# Patient Record
Sex: MALE | Race: WHITE | NOT HISPANIC OR LATINO | Employment: FULL TIME | URBAN - METROPOLITAN AREA
[De-identification: names, ages, dates, MRNs, and addresses within clinical notes are randomized per-mention and may not be internally consistent; named-entity substitution may affect disease eponyms.]

---

## 2017-09-29 ENCOUNTER — LAB REQUISITION (OUTPATIENT)
Dept: LAB | Facility: HOSPITAL | Age: 42
End: 2017-09-29

## 2017-09-29 ENCOUNTER — OUTSIDE FACILITY SERVICE (OUTPATIENT)
Dept: GASTROENTEROLOGY | Facility: CLINIC | Age: 42
End: 2017-09-29

## 2017-09-29 DIAGNOSIS — Z80.0 FAMILY HISTORY OF MALIGNANT NEOPLASM OF DIGESTIVE ORGAN: ICD-10-CM

## 2017-09-29 DIAGNOSIS — D12.2 BENIGN NEOPLASM OF ASCENDING COLON: ICD-10-CM

## 2017-09-29 PROCEDURE — 88305 TISSUE EXAM BY PATHOLOGIST: CPT | Performed by: INTERNAL MEDICINE

## 2017-09-29 PROCEDURE — 45385 COLONOSCOPY W/LESION REMOVAL: CPT | Performed by: INTERNAL MEDICINE

## 2017-10-02 LAB
CYTO UR: NORMAL
LAB AP CASE REPORT: NORMAL
LAB AP CLINICAL INFORMATION: NORMAL
Lab: NORMAL
PATH REPORT.FINAL DX SPEC: NORMAL
PATH REPORT.GROSS SPEC: NORMAL

## 2017-10-03 ENCOUNTER — TELEPHONE (OUTPATIENT)
Dept: GASTROENTEROLOGY | Facility: CLINIC | Age: 42
End: 2017-10-03

## 2017-10-03 NOTE — TELEPHONE ENCOUNTER
----- Message from Nav Felton MD sent at 10/3/2017  3:02 PM EDT -----  Let Mr. Banerjee know that he did have a serrated adenoma.  He should have a repeat exam in 3 years given the finding and his family history.  Thank you,  JOSE

## 2017-10-04 ENCOUNTER — TELEPHONE (OUTPATIENT)
Dept: GASTROENTEROLOGY | Facility: CLINIC | Age: 42
End: 2017-10-04

## 2017-10-05 NOTE — TELEPHONE ENCOUNTER
Returned patient's call. Informed him that I had already talked to him since I left him that voice message. Patient voiced understanding.

## 2021-04-05 DIAGNOSIS — Z12.11 SCREENING FOR COLON CANCER: Primary | ICD-10-CM

## 2021-04-26 DIAGNOSIS — Z12.11 SCREENING FOR COLON CANCER: Primary | ICD-10-CM

## 2021-04-26 RX ORDER — SODIUM, POTASSIUM,MAG SULFATES 17.5-3.13G
1 SOLUTION, RECONSTITUTED, ORAL ORAL TAKE AS DIRECTED
Qty: 354 ML | Refills: 0 | Status: SHIPPED | OUTPATIENT
Start: 2021-04-26

## 2021-05-19 ENCOUNTER — OUTSIDE FACILITY SERVICE (OUTPATIENT)
Dept: GASTROENTEROLOGY | Facility: CLINIC | Age: 46
End: 2021-05-19

## 2021-05-19 PROCEDURE — 45378 DIAGNOSTIC COLONOSCOPY: CPT | Performed by: INTERNAL MEDICINE

## 2024-05-01 ENCOUNTER — TELEPHONE (OUTPATIENT)
Dept: GASTROENTEROLOGY | Facility: CLINIC | Age: 49
End: 2024-05-01

## 2024-05-01 NOTE — TELEPHONE ENCOUNTER
Hub staff attempted to follow warm transfer process and was unsuccessful     Caller: YASH TRAVIS    Relationship to patient: Emergency Contact    Best call back number: [phone]    Patient is needing: PTS WIFE CALLING TO SCHEDULE SCOPE. (NO BHV ON FILE)

## 2024-06-04 RX ORDER — SODIUM, POTASSIUM,MAG SULFATES 17.5-3.13G
1 SOLUTION, RECONSTITUTED, ORAL ORAL TAKE AS DIRECTED
Qty: 354 ML | Refills: 0 | Status: SHIPPED | OUTPATIENT
Start: 2024-06-04

## 2024-06-11 ENCOUNTER — OUTSIDE FACILITY SERVICE (OUTPATIENT)
Dept: GASTROENTEROLOGY | Facility: CLINIC | Age: 49
End: 2024-06-11
Payer: COMMERCIAL

## 2025-05-06 ENCOUNTER — APPOINTMENT (OUTPATIENT)
Dept: CT IMAGING | Facility: HOSPITAL | Age: 50
End: 2025-05-06
Payer: COMMERCIAL

## 2025-05-06 ENCOUNTER — APPOINTMENT (OUTPATIENT)
Dept: GENERAL RADIOLOGY | Facility: HOSPITAL | Age: 50
End: 2025-05-06
Payer: COMMERCIAL

## 2025-05-06 ENCOUNTER — HOSPITAL ENCOUNTER (OUTPATIENT)
Facility: HOSPITAL | Age: 50
Setting detail: OBSERVATION
Discharge: HOME | End: 2025-05-07
Attending: EMERGENCY MEDICINE | Admitting: HOSPITALIST
Payer: COMMERCIAL

## 2025-05-06 DIAGNOSIS — F10.90 ALCOHOL USE: ICD-10-CM

## 2025-05-06 DIAGNOSIS — Z86.39 HISTORY OF HYPERLIPIDEMIA: ICD-10-CM

## 2025-05-06 DIAGNOSIS — R07.89 ATYPICAL CHEST PAIN: Primary | ICD-10-CM

## 2025-05-06 DIAGNOSIS — Z72.0 TOBACCO USE: ICD-10-CM

## 2025-05-06 DIAGNOSIS — R06.09 EXERTIONAL DYSPNEA: ICD-10-CM

## 2025-05-06 DIAGNOSIS — K76.9 LESION OF LIVER: ICD-10-CM

## 2025-05-06 DIAGNOSIS — R11.0 NAUSEA: ICD-10-CM

## 2025-05-06 PROBLEM — R07.9 CHEST PAIN: Status: ACTIVE | Noted: 2025-05-06

## 2025-05-06 LAB
ALBUMIN SERPL-MCNC: 4.2 G/DL (ref 3.5–5.2)
ALBUMIN/GLOB SERPL: 1.8 G/DL
ALP SERPL-CCNC: 58 U/L (ref 39–117)
ALT SERPL W P-5'-P-CCNC: 28 U/L (ref 1–41)
ANION GAP SERPL CALCULATED.3IONS-SCNC: 12 MMOL/L (ref 5–15)
AST SERPL-CCNC: 22 U/L (ref 1–40)
BASOPHILS # BLD AUTO: 0.04 10*3/MM3 (ref 0–0.2)
BASOPHILS NFR BLD AUTO: 0.6 % (ref 0–1.5)
BILIRUB SERPL-MCNC: 0.3 MG/DL (ref 0–1.2)
BUN SERPL-MCNC: 11 MG/DL (ref 6–20)
BUN/CREAT SERPL: 9.2 (ref 7–25)
CALCIUM SPEC-SCNC: 9.4 MG/DL (ref 8.6–10.5)
CHLORIDE SERPL-SCNC: 103 MMOL/L (ref 98–107)
CO2 SERPL-SCNC: 24 MMOL/L (ref 22–29)
CREAT SERPL-MCNC: 1.19 MG/DL (ref 0.76–1.27)
DEPRECATED RDW RBC AUTO: 41.3 FL (ref 37–54)
EGFRCR SERPLBLD CKD-EPI 2021: 75.3 ML/MIN/1.73
EOSINOPHIL # BLD AUTO: 0.39 10*3/MM3 (ref 0–0.4)
EOSINOPHIL NFR BLD AUTO: 5.8 % (ref 0.3–6.2)
ERYTHROCYTE [DISTWIDTH] IN BLOOD BY AUTOMATED COUNT: 12.8 % (ref 12.3–15.4)
GEN 5 1HR TROPONIN T REFLEX: 7 NG/L
GLOBULIN UR ELPH-MCNC: 2.4 GM/DL
GLUCOSE SERPL-MCNC: 101 MG/DL (ref 65–99)
HCT VFR BLD AUTO: 49.1 % (ref 37.5–51)
HGB BLD-MCNC: 16.4 G/DL (ref 13–17.7)
HOLD SPECIMEN: NORMAL
IMM GRANULOCYTES # BLD AUTO: 0.01 10*3/MM3 (ref 0–0.05)
IMM GRANULOCYTES NFR BLD AUTO: 0.1 % (ref 0–0.5)
LIPASE SERPL-CCNC: 63 U/L (ref 13–60)
LYMPHOCYTES # BLD AUTO: 2.04 10*3/MM3 (ref 0.7–3.1)
LYMPHOCYTES NFR BLD AUTO: 30.1 % (ref 19.6–45.3)
MCH RBC QN AUTO: 29.2 PG (ref 26.6–33)
MCHC RBC AUTO-ENTMCNC: 33.4 G/DL (ref 31.5–35.7)
MCV RBC AUTO: 87.5 FL (ref 79–97)
MONOCYTES # BLD AUTO: 0.66 10*3/MM3 (ref 0.1–0.9)
MONOCYTES NFR BLD AUTO: 9.7 % (ref 5–12)
NEUTROPHILS NFR BLD AUTO: 3.63 10*3/MM3 (ref 1.7–7)
NEUTROPHILS NFR BLD AUTO: 53.7 % (ref 42.7–76)
NRBC BLD AUTO-RTO: 0 /100 WBC (ref 0–0.2)
NT-PROBNP SERPL-MCNC: <36 PG/ML (ref 0–450)
PLATELET # BLD AUTO: 242 10*3/MM3 (ref 140–450)
PMV BLD AUTO: 9.3 FL (ref 6–12)
POTASSIUM SERPL-SCNC: 3.6 MMOL/L (ref 3.5–5.2)
PROT SERPL-MCNC: 6.6 G/DL (ref 6–8.5)
RBC # BLD AUTO: 5.61 10*6/MM3 (ref 4.14–5.8)
SODIUM SERPL-SCNC: 139 MMOL/L (ref 136–145)
TROPONIN T NUMERIC DELTA: 0 NG/L
TROPONIN T SERPL HS-MCNC: 7 NG/L
WBC NRBC COR # BLD AUTO: 6.77 10*3/MM3 (ref 3.4–10.8)
WHOLE BLOOD HOLD COAG: NORMAL
WHOLE BLOOD HOLD SPECIMEN: NORMAL

## 2025-05-06 PROCEDURE — 83880 ASSAY OF NATRIURETIC PEPTIDE: CPT | Performed by: EMERGENCY MEDICINE

## 2025-05-06 PROCEDURE — 25510000001 IOPAMIDOL PER 1 ML: Performed by: EMERGENCY MEDICINE

## 2025-05-06 PROCEDURE — 80053 COMPREHEN METABOLIC PANEL: CPT | Performed by: EMERGENCY MEDICINE

## 2025-05-06 PROCEDURE — G0378 HOSPITAL OBSERVATION PER HR: HCPCS

## 2025-05-06 PROCEDURE — 36415 COLL VENOUS BLD VENIPUNCTURE: CPT

## 2025-05-06 PROCEDURE — 83690 ASSAY OF LIPASE: CPT | Performed by: EMERGENCY MEDICINE

## 2025-05-06 PROCEDURE — 71275 CT ANGIOGRAPHY CHEST: CPT

## 2025-05-06 PROCEDURE — 71045 X-RAY EXAM CHEST 1 VIEW: CPT

## 2025-05-06 PROCEDURE — 99285 EMERGENCY DEPT VISIT HI MDM: CPT

## 2025-05-06 PROCEDURE — 93005 ELECTROCARDIOGRAM TRACING: CPT | Performed by: EMERGENCY MEDICINE

## 2025-05-06 PROCEDURE — 84484 ASSAY OF TROPONIN QUANT: CPT | Performed by: EMERGENCY MEDICINE

## 2025-05-06 PROCEDURE — 85025 COMPLETE CBC W/AUTO DIFF WBC: CPT | Performed by: EMERGENCY MEDICINE

## 2025-05-06 RX ORDER — IOPAMIDOL 755 MG/ML
100 INJECTION, SOLUTION INTRAVASCULAR
Status: COMPLETED | OUTPATIENT
Start: 2025-05-06 | End: 2025-05-06

## 2025-05-06 RX ORDER — SODIUM CHLORIDE 0.9 % (FLUSH) 0.9 %
10 SYRINGE (ML) INJECTION AS NEEDED
Status: DISCONTINUED | OUTPATIENT
Start: 2025-05-06 | End: 2025-05-07 | Stop reason: HOSPADM

## 2025-05-06 RX ORDER — ASPIRIN 81 MG/1
324 TABLET, CHEWABLE ORAL ONCE
Status: COMPLETED | OUTPATIENT
Start: 2025-05-06 | End: 2025-05-06

## 2025-05-06 RX ADMIN — IOPAMIDOL 70 ML: 755 INJECTION, SOLUTION INTRAVENOUS at 21:25

## 2025-05-06 RX ADMIN — ASPIRIN 324 MG: 81 TABLET, CHEWABLE ORAL at 20:41

## 2025-05-06 NOTE — Clinical Note
Level of Care: Telemetry [5]   Diagnosis: Chest pain [316212]   Admitting Physician: DENITA MACHADO III [[id number]]   Attending Physician: DENITA MACHADO III [[id number]]   Bed Request Comments: tele obs

## 2025-05-06 NOTE — ED PROVIDER NOTES
Subjective   History of Present Illness  This is a 50-year-old male that presents to the ER with waxing and waning exertional left chest heaviness and shortness of breath that has been ongoing since 11 AM earlier this morning.  Patient is a  and is quite active at work performing strenuous activity of lifting repetitively and driving extended periods each day.  Patient says around 11 AM this morning he had sudden onset of left chest tightness and squeezing sensation with left arm numbness and tingling and associated shortness of breath and nausea.  He left work and was evaluated at [place] [facility] with 2 normal cardiac troponins.  Patient went home and then started to experience further left-sided chest pressure with squeezing sensation and exertional dyspnea and nausea.  He became concerned and came to the ER for further assessment.  He has had a stress test, but it has been greater than 5 years ago and it was normal.  He chews tobacco and says that outpatient labs recently showed elevated LDL, although patient does not take any medications for hyperlipidemia.  He does report family history of CAD in maternal grandfather at 66 years of age.  Patient denies any recent cough, nasal congestion, or respiratory symptoms.  No other concerns at this time.    History provided by:  Patient, spouse and medical records  Chest Pain  Pain location:  L chest  Pain quality: tightness    Pain quality comment:  Squeezing sensation with pressure  Radiates to: Left arm numbness and tingling.  Onset quality:  Sudden  Duration:  8 hours  Timing:  Intermittent  Progression:  Worsening  Chronicity:  New  Context comment:  Patient has had a 8-hour history of waxing and waning exertional left chest heaviness, associated shortness of breath, and nausea.  Relieved by:  Nothing  Worsened by:  Exertion (Activity and exertion)  Ineffective treatments:  None tried  Associated symptoms: anorexia, dizziness, nausea, numbness (Numbness and  tingling to left arm) and shortness of breath (exertional dyspnea)    Associated symptoms: no abdominal pain, no altered mental status, no anxiety, no back pain, no cough, no diaphoresis, no fatigue, no headache, no heartburn, no lower extremity edema, no near-syncope, no palpitations, no vomiting and no weakness    Risk factors: high cholesterol, obesity and smoking    Risk factors: no coronary artery disease, no diabetes mellitus and no hypertension    Risk factors comment:  Family history of CAD in maternal GF at 66 years of age.      Review of Systems   Constitutional:  Positive for appetite change. Negative for diaphoresis and fatigue.   HENT: Negative.  Negative for congestion, ear pain, postnasal drip, rhinorrhea, sinus pressure, sinus pain, sneezing and sore throat.    Respiratory:  Positive for shortness of breath (exertional dyspnea). Negative for cough.         Patient chews tobacco   Cardiovascular:  Positive for chest pain (Left chest heaviness/squeezing). Negative for palpitations, leg swelling and near-syncope.   Gastrointestinal:  Positive for anorexia and nausea. Negative for abdominal pain, diarrhea, heartburn and vomiting.   Genitourinary: Negative.    Musculoskeletal: Negative.  Negative for back pain.   Neurological:  Positive for dizziness and numbness (Numbness and tingling to left arm). Negative for syncope, weakness and headaches.   Psychiatric/Behavioral:          Patient usually drinks 2 beers each evening after work   All other systems reviewed and are negative.      No past medical history on file.    No Known Allergies    No past surgical history on file.    Family History   Problem Relation Age of Onset    ALS Father     Heart disease Maternal Grandfather        Social History     Socioeconomic History    Marital status:    Tobacco Use    Smoking status: Never    Smokeless tobacco: Current   Substance and Sexual Activity    Alcohol use: Yes     Alcohol/week: 2.0 standard drinks  of alcohol     Types: 2 Cans of beer per week     Comment: 1-2 beers daily    Drug use: Never    Sexual activity: Defer           Objective   Physical Exam  Vitals and nursing note reviewed.   Constitutional:       General: He is not in acute distress.     Appearance: Normal appearance. He is not ill-appearing, toxic-appearing or diaphoretic.      Comments: No acute sign of pain or distress.  Nontoxic   HENT:      Head: Normocephalic and atraumatic.      Nose: Nose normal. No congestion or rhinorrhea.      Mouth/Throat:      Mouth: Mucous membranes are moist.      Pharynx: Oropharynx is clear.      Comments: Oral mucous membranes are moist  Eyes:      Extraocular Movements: Extraocular movements intact.      Conjunctiva/sclera: Conjunctivae normal.      Pupils: Pupils are equal, round, and reactive to light.   Cardiovascular:      Rate and Rhythm: Normal rate and regular rhythm. No extrasystoles are present.     Pulses: Normal pulses.      Heart sounds: Normal heart sounds.      Comments: Regular rate and rhythm.  No tachycardia or ectopy.  No pedal edema to lower extremities  Pulmonary:      Effort: Pulmonary effort is normal. No tachypnea or retractions.      Breath sounds: Normal breath sounds. No decreased breath sounds.      Comments: Lungs are clear to auscultation bilaterally  Chest:      Chest wall: No swelling, tenderness, crepitus or edema.      Comments: Chest wall nontender to palpation  Abdominal:      General: Bowel sounds are normal. There is no distension.      Palpations: Abdomen is soft.      Tenderness: There is no abdominal tenderness. There is no right CVA tenderness, left CVA tenderness, guarding or rebound.      Comments: Abdomen soft and nontender.  No flank or CVA tenderness   Musculoskeletal:         General: Normal range of motion.      Cervical back: Normal range of motion and neck supple.      Right lower leg: No edema.      Left lower leg: No edema.   Skin:     General: Skin is warm and  dry.   Neurological:      General: No focal deficit present.      Mental Status: He is alert and oriented to person, place, and time.      Cranial Nerves: Cranial nerves 2-12 are intact.      Sensory: Sensation is intact.      Motor: Motor function is intact.      Coordination: Coordination is intact.      Gait: Gait is intact.      Comments: Neuro intact and nonfocal   Psychiatric:         Mood and Affect: Mood and affect normal.         Behavior: Behavior is cooperative.         Thought Content: Thought content normal.         Cognition and Memory: Cognition and memory normal.         Judgment: Judgment normal.      Comments: Normal mood and affect         Procedures           ED Course  ED Course as of 05/06/25 2349   Tue May 06, 2025   2346 I personally interpreted EKGs x 2 which revealed normal sinus rhythm.  There was no acute ST-T wave changes consistent with ischemia.  I personally interpreted chest x-ray which reveals some increased perihilar markings but no acute infiltrative process or pulmonary vascular congestion.  CBC and chemistries were within normal limits, including normal LFTs.  High-sensitivity troponins x 2 sets were 7 and BNP was less than 36.  Heart score is 4 and PERC score is 1.  We proceeded with CT angiogram of the chest with contrast which revealed no PE or other acute cardiopulmonary process.  Patient did have incidental finding of some multiple small low-density lesions in the liver measuring slightly higher than fluid density.  Radiologist recommended contrast-enhanced hepatic protocol MRI.  Discussed the case with Dr. Rodriguez, ER attending physician.  We recommend overnight observational admission for cardiac rule out per MI protocol and further assessment of liver lesions.  Discussed admission with Dr. Clayton, and he is agreeable to admission on telemetry.  I updated patient and wife on all results and need for admission and they are appreciative.  We gave full-strength aspirin at  324 mg by mouth and patient has not had any chest pain throughout the ER course.  I did review his diagnostic workup through [facility], and he had reassuring cardiac workup with 2 normal high-sensitivity troponins. [FC]      ED Course User Index  [FC] Mikaela Haque PA-C                  HEART Score: 4                                    Recent Results (from the past 24 hours)   ECG 12 Lead Chest Pain    Collection Time: 05/06/25  7:55 PM   Result Value Ref Range    QT Interval 364 ms    QTC Interval 417 ms   High Sensitivity Troponin T    Collection Time: 05/06/25  8:18 PM    Specimen: Blood   Result Value Ref Range    HS Troponin T 7 <22 ng/L   Comprehensive Metabolic Panel    Collection Time: 05/06/25  8:18 PM    Specimen: Blood   Result Value Ref Range    Glucose 101 (H) 65 - 99 mg/dL    BUN 11 6 - 20 mg/dL    Creatinine 1.19 0.76 - 1.27 mg/dL    Sodium 139 136 - 145 mmol/L    Potassium 3.6 3.5 - 5.2 mmol/L    Chloride 103 98 - 107 mmol/L    CO2 24.0 22.0 - 29.0 mmol/L    Calcium 9.4 8.6 - 10.5 mg/dL    Total Protein 6.6 6.0 - 8.5 g/dL    Albumin 4.2 3.5 - 5.2 g/dL    ALT (SGPT) 28 1 - 41 U/L    AST (SGOT) 22 1 - 40 U/L    Alkaline Phosphatase 58 39 - 117 U/L    Total Bilirubin 0.3 0.0 - 1.2 mg/dL    Globulin 2.4 gm/dL    A/G Ratio 1.8 g/dL    BUN/Creatinine Ratio 9.2 7.0 - 25.0    Anion Gap 12.0 5.0 - 15.0 mmol/L    eGFR 75.3 >60.0 mL/min/1.73   Lipase    Collection Time: 05/06/25  8:18 PM    Specimen: Blood   Result Value Ref Range    Lipase 63 (H) 13 - 60 U/L   BNP    Collection Time: 05/06/25  8:18 PM    Specimen: Blood   Result Value Ref Range    proBNP <36.0 0.0 - 450.0 pg/mL   Green Top (Gel)    Collection Time: 05/06/25  8:18 PM   Result Value Ref Range    Extra Tube Hold for add-ons.    Lavender Top    Collection Time: 05/06/25  8:18 PM   Result Value Ref Range    Extra Tube hold for add-on    Gold Top - SST    Collection Time: 05/06/25  8:18 PM   Result Value Ref Range    Extra Tube Hold for add-ons.     Fatima Top    Collection Time: 05/06/25  8:18 PM   Result Value Ref Range    Extra Tube Hold for add-ons.    Light Blue Top    Collection Time: 05/06/25  8:18 PM   Result Value Ref Range    Extra Tube Hold for add-ons.    CBC Auto Differential    Collection Time: 05/06/25  8:18 PM    Specimen: Blood   Result Value Ref Range    WBC 6.77 3.40 - 10.80 10*3/mm3    RBC 5.61 4.14 - 5.80 10*6/mm3    Hemoglobin 16.4 13.0 - 17.7 g/dL    Hematocrit 49.1 37.5 - 51.0 %    MCV 87.5 79.0 - 97.0 fL    MCH 29.2 26.6 - 33.0 pg    MCHC 33.4 31.5 - 35.7 g/dL    RDW 12.8 12.3 - 15.4 %    RDW-SD 41.3 37.0 - 54.0 fl    MPV 9.3 6.0 - 12.0 fL    Platelets 242 140 - 450 10*3/mm3    Neutrophil % 53.7 42.7 - 76.0 %    Lymphocyte % 30.1 19.6 - 45.3 %    Monocyte % 9.7 5.0 - 12.0 %    Eosinophil % 5.8 0.3 - 6.2 %    Basophil % 0.6 0.0 - 1.5 %    Immature Grans % 0.1 0.0 - 0.5 %    Neutrophils, Absolute 3.63 1.70 - 7.00 10*3/mm3    Lymphocytes, Absolute 2.04 0.70 - 3.10 10*3/mm3    Monocytes, Absolute 0.66 0.10 - 0.90 10*3/mm3    Eosinophils, Absolute 0.39 0.00 - 0.40 10*3/mm3    Basophils, Absolute 0.04 0.00 - 0.20 10*3/mm3    Immature Grans, Absolute 0.01 0.00 - 0.05 10*3/mm3    nRBC 0.0 0.0 - 0.2 /100 WBC   ECG 12 Lead Chest Pain    Collection Time: 05/06/25  8:55 PM   Result Value Ref Range    QT Interval 388 ms    QTC Interval 427 ms   High Sensitivity Troponin T 1Hr    Collection Time: 05/06/25  9:39 PM    Specimen: Blood   Result Value Ref Range    HS Troponin T 7 <22 ng/L    Troponin T Numeric Delta 0 Abnormal if >/=3 ng/L     Note: In addition to lab results from this visit, the labs listed above may include labs taken at another facility or during a different encounter within the last 24 hours. Please correlate lab times with ED admission and discharge times for further clarification of the services performed during this visit.    CT Angiogram Chest   Final Result   Impression:   1.No acute pulmonary embolism.   2.Mild bibasilar  atelectasis.   3.Multiple small low-density lesions in the liver measuring slightly higher than fluid density. Recommend correlation with contrast-enhanced hepatic protocol MRI.            Electronically Signed: Forrest Pugh MD     5/6/2025 9:33 PM EDT     Workstation ID: [id number]      XR Chest 1 View   Final Result   No acute cardiopulmonary abnormality.               Electronically Signed: Heike Turpin MD     5/6/2025 8:17 PM EDT     Workstation ID: [id number]      MRI Abdomen With & Without Contrast    (Results Pending)     Vitals:    05/06/25 2230 05/06/25 2300 05/06/25 2315 05/06/25 2330   BP: 122/88 116/87  (!) 121/107   Pulse: 65 65 69 84   Resp:       Temp:       SpO2: 95% 96% 96% 95%   Weight:       Height:         Medications   sodium chloride 0.9 % flush 10 mL (has no administration in time range)   aspirin chewable tablet 324 mg (324 mg Oral Given 5/6/25 2041)   iopamidol (ISOVUE-370) 76 % injection 100 mL (70 mL Intravenous Given 5/6/25 2125)     ECG/EMG Results (last 24 hours)       Procedure Component Value Units Date/Time    ECG 12 Lead Chest Pain [[id number]] Collected: 05/06/25 1955     Updated: 05/06/25 1955     QT Interval 364 ms      QTC Interval 417 ms     Narrative:      Test Reason : Chest Pain  Blood Pressure :   */*   mmHG  Vent. Rate :  79 BPM     Atrial Rate :  79 BPM     P-R Int : 142 ms          QRS Dur :  86 ms      QT Int : 364 ms       P-R-T Axes :  51 -33  27 degrees    QTcB Int : 417 ms    Normal sinus rhythm  Left axis deviation  Abnormal ECG  No previous ECGs available    Referred By: EDMD           Confirmed By:     ECG 12 Lead Chest Pain [[id number]] Collected: 05/06/25 2055     Updated: 05/06/25 2056     QT Interval 388 ms      QTC Interval 427 ms     Narrative:      Test Reason : Chest Pain  Blood Pressure :   */*   mmHG  Vent. Rate :  73 BPM     Atrial Rate :  73 BPM     P-R Int : 148 ms          QRS Dur :  88 ms      QT Int : 388 ms       P-R-T Axes :  52 -28  31 degrees     QTcB Int : 427 ms    Normal sinus rhythm  Possible Inferior infarct , age undetermined  Abnormal ECG  When compared with ECG of 06-May-2025 19:55, (Unconfirmed)  No significant change was found    Referred By: ED MD           Confirmed By:           ECG 12 Lead Chest Pain   Preliminary Result   Test Reason : Chest Pain   Blood Pressure :   */*   mmHG   Vent. Rate :  73 BPM     Atrial Rate :  73 BPM      P-R Int : 148 ms          QRS Dur :  88 ms       QT Int : 388 ms       P-R-T Axes :  52 -28  31 degrees     QTcB Int : 427 ms      Normal sinus rhythm   Possible Inferior infarct , age undetermined   Abnormal ECG   When compared with ECG of 06-May-2025 19:55, (Unconfirmed)   No significant change was found      Referred By: ED MD           Confirmed By:       ECG 12 Lead Chest Pain   Preliminary Result   Test Reason : Chest Pain   Blood Pressure :   */*   mmHG   Vent. Rate :  79 BPM     Atrial Rate :  79 BPM      P-R Int : 142 ms          QRS Dur :  86 ms       QT Int : 364 ms       P-R-T Axes :  51 -33  27 degrees     QTcB Int : 417 ms      Normal sinus rhythm   Left axis deviation   Abnormal ECG   No previous ECGs available      Referred By: EDMD           Confirmed By:         HEART Score for Major Cardiac Events - MDCalc  Calculated on May 06 2025 10:43 PM  4 points -> Moderate Score (4-6 points) Risk of MACE of 12-16.6%.    PERC Rule for Pulmonary Embolism - MDCalc  Calculated on May 06 2025 11:46 PM  1 criteria -> If any criteria are positive, the PERC rule cannot be used to rule out PE in this patient.  Medical Decision Making  Amount and/or Complexity of Data Reviewed  Labs: ordered.  Radiology: ordered.  ECG/medicine tests: ordered.    Risk  OTC drugs.  Prescription drug management.  Decision regarding hospitalization.        Final diagnoses:   Atypical chest pain   Exertional dyspnea   Nausea   Tobacco use   Alcohol use   History of hyperlipidemia   Lesion of liver       ED Disposition  ED Disposition        ED Disposition   Decision to Admit    Condition   --    Comment   Level of Care: Telemetry [5]   Diagnosis: Chest pain [900944]   Admitting Physician: DENITA MACHADO III [[id number]]   Attending Physician: DENITA MACHADO III [[id number]]   Is patient appropriate for Inpatient Observation Unit?: No [0]   Bed Request Comments: tele obs                 No follow-up provider specified.       Medication List      No changes were made to your prescriptions during this visit.            Mikaela Haque PA-C  05/06/25 8711

## 2025-05-07 ENCOUNTER — APPOINTMENT (OUTPATIENT)
Dept: CARDIOLOGY | Facility: HOSPITAL | Age: 50
End: 2025-05-07
Payer: COMMERCIAL

## 2025-05-07 ENCOUNTER — APPOINTMENT (OUTPATIENT)
Dept: MRI IMAGING | Facility: HOSPITAL | Age: 50
End: 2025-05-07
Payer: COMMERCIAL

## 2025-05-07 VITALS
BODY MASS INDEX: 39.28 KG/M2 | TEMPERATURE: 98 F | WEIGHT: 290 LBS | RESPIRATION RATE: 18 BRPM | HEART RATE: 80 BPM | HEIGHT: 72 IN | OXYGEN SATURATION: 96 % | SYSTOLIC BLOOD PRESSURE: 111 MMHG | DIASTOLIC BLOOD PRESSURE: 84 MMHG

## 2025-05-07 LAB
ANION GAP SERPL CALCULATED.3IONS-SCNC: 8 MMOL/L (ref 5–15)
AORTIC DIMENSIONLESS INDEX: 0.8 (DI)
ASCENDING AORTA: 3.3 CM
AV MEAN PRESS GRAD SYS DOP V1V2: 1.1 MMHG
AV VMAX SYS DOP: 70.7 CM/SEC
BASOPHILS # BLD AUTO: 0.06 10*3/MM3 (ref 0–0.2)
BASOPHILS NFR BLD AUTO: 0.9 % (ref 0–1.5)
BH CV ECHO MEAS - 2D AUTO EF SIEMENS: 58 %
BH CV ECHO MEAS - AO MAX PG: 2 MMHG
BH CV ECHO MEAS - AO ROOT DIAM: 2.9 CM
BH CV ECHO MEAS - AO V2 VTI: 13.6 CM
BH CV ECHO MEAS - AVA(I,D): 2.5 CM2
BH CV ECHO MEAS - IVS/LVPW: 1 CM
BH CV ECHO MEAS - IVSD: 1 CM
BH CV ECHO MEAS - LA DIMENSION: 3 CM
BH CV ECHO MEAS - LAT PEAK E' VEL: 10.8 CM/SEC
BH CV ECHO MEAS - LV MAX PG: 1.18 MMHG
BH CV ECHO MEAS - LV MEAN PG: 0.6 MMHG
BH CV ECHO MEAS - LV V1 MAX: 54.4 CM/SEC
BH CV ECHO MEAS - LV V1 VTI: 10.9 CM
BH CV ECHO MEAS - LVIDD: 4.5 CM
BH CV ECHO MEAS - LVIDS: 2.9 CM
BH CV ECHO MEAS - LVOT AREA: 3.1 CM2
BH CV ECHO MEAS - LVOT DIAM: 2 CM
BH CV ECHO MEAS - LVPWD: 1 CM
BH CV ECHO MEAS - MED PEAK E' VEL: 7.8 CM/SEC
BH CV ECHO MEAS - MV A MAX VEL: 48.5 CM/SEC
BH CV ECHO MEAS - MV DEC SLOPE: 173.1 CM/SEC2
BH CV ECHO MEAS - MV E MAX VEL: 45 CM/SEC
BH CV ECHO MEAS - MV E/A: 0.93
BH CV ECHO MEAS - MV MAX PG: 1.25 MMHG
BH CV ECHO MEAS - MV MEAN PG: 0.6 MMHG
BH CV ECHO MEAS - MV P1/2T: 85 MSEC
BH CV ECHO MEAS - MV V2 VTI: 18 CM
BH CV ECHO MEAS - MVA(P1/2T): 2.6 CM2
BH CV ECHO MEAS - MVA(VTI): 1.9 CM2
BH CV ECHO MEAS - PA ACC TIME: 0.12 SEC
BH CV ECHO MEAS - PI END-D VEL: 112.8 CM/SEC
BH CV ECHO MEAS - SV(LVOT): 34.2 ML
BH CV ECHO MEAS - SVI(LVOT): 13.7 ML/M2
BH CV ECHO MEAS - TAPSE (>1.6): 2.2 CM
BH CV ECHO MEASUREMENTS AVERAGE E/E' RATIO: 4.84
BH CV REST NUCLEAR ISOTOPE DOSE: 30 MCI
BH CV STRESS BP STAGE 1: NORMAL
BH CV STRESS BP STAGE 2: NORMAL
BH CV STRESS BP STAGE 4: NORMAL
BH CV STRESS COMMENTS STAGE 1: NORMAL
BH CV STRESS DOSE REGADENOSON STAGE 1: 0.4
BH CV STRESS DURATION MIN STAGE 1: 1
BH CV STRESS DURATION MIN STAGE 2: 1
BH CV STRESS DURATION MIN STAGE 3: 1
BH CV STRESS DURATION MIN STAGE 4: 1
BH CV STRESS HR STAGE 1: 101
BH CV STRESS HR STAGE 2: 89
BH CV STRESS HR STAGE 3: 81
BH CV STRESS HR STAGE 4: 82
BH CV STRESS NUCLEAR ISOTOPE DOSE: 29.9 MCI
BH CV STRESS O2 STAGE 1: 98
BH CV STRESS O2 STAGE 2: 97
BH CV STRESS O2 STAGE 3: 97
BH CV STRESS O2 STAGE 4: 97
BH CV STRESS PROTOCOL 1: NORMAL
BH CV STRESS RECOVERY BP: NORMAL MMHG
BH CV STRESS RECOVERY HR: 70 BPM
BH CV STRESS RECOVERY O2: 95 %
BH CV STRESS STAGE 1: 1
BH CV STRESS STAGE 2: 2
BH CV STRESS STAGE 3: 3
BH CV STRESS STAGE 4: 4
BH CV XLRA - RV BASE: 3.4 CM
BH CV XLRA - RV LENGTH: 7.7 CM
BH CV XLRA - RV MID: 3.3 CM
BH CV XLRA - TDI S': 9.5 CM/SEC
BUN SERPL-MCNC: 11 MG/DL (ref 6–20)
BUN/CREAT SERPL: 8.9 (ref 7–25)
CALCIUM SPEC-SCNC: 9.1 MG/DL (ref 8.6–10.5)
CHLORIDE SERPL-SCNC: 105 MMOL/L (ref 98–107)
CHOLEST SERPL-MCNC: 194 MG/DL (ref 0–200)
CO2 SERPL-SCNC: 27 MMOL/L (ref 22–29)
CREAT SERPL-MCNC: 1.24 MG/DL (ref 0.76–1.27)
DEPRECATED RDW RBC AUTO: 41.1 FL (ref 37–54)
EGFRCR SERPLBLD CKD-EPI 2021: 71.7 ML/MIN/1.73
EOSINOPHIL # BLD AUTO: 0.47 10*3/MM3 (ref 0–0.4)
EOSINOPHIL NFR BLD AUTO: 7.3 % (ref 0.3–6.2)
ERYTHROCYTE [DISTWIDTH] IN BLOOD BY AUTOMATED COUNT: 12.9 % (ref 12.3–15.4)
GLUCOSE SERPL-MCNC: 102 MG/DL (ref 65–99)
HBA1C MFR BLD: 5.4 % (ref 4.8–5.6)
HCT VFR BLD AUTO: 50.1 % (ref 37.5–51)
HDLC SERPL-MCNC: 50 MG/DL (ref 40–60)
HGB BLD-MCNC: 16.8 G/DL (ref 13–17.7)
IMM GRANULOCYTES # BLD AUTO: 0.01 10*3/MM3 (ref 0–0.05)
IMM GRANULOCYTES NFR BLD AUTO: 0.2 % (ref 0–0.5)
LDLC SERPL CALC-MCNC: 124 MG/DL (ref 0–100)
LDLC/HDLC SERPL: 2.43 {RATIO}
LEFT ATRIUM VOLUME INDEX: 18.3 ML/M2
LYMPHOCYTES # BLD AUTO: 1.93 10*3/MM3 (ref 0.7–3.1)
LYMPHOCYTES NFR BLD AUTO: 30.1 % (ref 19.6–45.3)
MAXIMAL PREDICTED HEART RATE: 172 BPM
MCH RBC QN AUTO: 29.3 PG (ref 26.6–33)
MCHC RBC AUTO-ENTMCNC: 33.5 G/DL (ref 31.5–35.7)
MCV RBC AUTO: 87.4 FL (ref 79–97)
MONOCYTES # BLD AUTO: 0.56 10*3/MM3 (ref 0.1–0.9)
MONOCYTES NFR BLD AUTO: 8.7 % (ref 5–12)
NEUTROPHILS NFR BLD AUTO: 3.38 10*3/MM3 (ref 1.7–7)
NEUTROPHILS NFR BLD AUTO: 52.8 % (ref 42.7–76)
NRBC BLD AUTO-RTO: 0 /100 WBC (ref 0–0.2)
PERCENT MAX PREDICTED HR: 58.14 %
PLATELET # BLD AUTO: 226 10*3/MM3 (ref 140–450)
PMV BLD AUTO: 9.1 FL (ref 6–12)
POTASSIUM SERPL-SCNC: 5.2 MMOL/L (ref 3.5–5.2)
QT INTERVAL: 410 MS
QTC INTERVAL: 426 MS
RBC # BLD AUTO: 5.73 10*6/MM3 (ref 4.14–5.8)
SODIUM SERPL-SCNC: 140 MMOL/L (ref 136–145)
SPECT HRT GATED+EF W RNC IV: 61 %
STRESS BASELINE BP: NORMAL MMHG
STRESS BASELINE HR: 62 BPM
STRESS O2 SAT REST: 96 %
STRESS PERCENT HR: 68 %
STRESS POST EXERCISE DUR MIN: 4 MIN
STRESS POST EXERCISE DUR SEC: 0 SEC
STRESS POST O2 SAT PEAK: 97 %
STRESS POST PEAK BP: NORMAL MMHG
STRESS POST PEAK HR: 100 BPM
STRESS TARGET HR: 146 BPM
TRIGL SERPL-MCNC: 112 MG/DL (ref 0–150)
TROPONIN T SERPL HS-MCNC: 7 NG/L
VLDLC SERPL-MCNC: 20 MG/DL (ref 5–40)
WBC NRBC COR # BLD AUTO: 6.41 10*3/MM3 (ref 3.4–10.8)

## 2025-05-07 PROCEDURE — G0378 HOSPITAL OBSERVATION PER HR: HCPCS

## 2025-05-07 PROCEDURE — 25510000002 GADOBENATE DIMEGLUMINE 529 MG/ML SOLUTION: Performed by: INTERNAL MEDICINE

## 2025-05-07 PROCEDURE — 93017 CV STRESS TEST TRACING ONLY: CPT

## 2025-05-07 PROCEDURE — 80048 BASIC METABOLIC PNL TOTAL CA: CPT | Performed by: NURSE PRACTITIONER

## 2025-05-07 PROCEDURE — 78431 MYOCRD IMG PET RST&STRS CT: CPT | Performed by: INTERNAL MEDICINE

## 2025-05-07 PROCEDURE — 93010 ELECTROCARDIOGRAM REPORT: CPT | Performed by: INTERNAL MEDICINE

## 2025-05-07 PROCEDURE — 85025 COMPLETE CBC W/AUTO DIFF WBC: CPT | Performed by: NURSE PRACTITIONER

## 2025-05-07 PROCEDURE — 93016 CV STRESS TEST SUPVJ ONLY: CPT | Performed by: INTERNAL MEDICINE

## 2025-05-07 PROCEDURE — A9555 RB82 RUBIDIUM: HCPCS | Performed by: NURSE PRACTITIONER

## 2025-05-07 PROCEDURE — A9577 INJ MULTIHANCE: HCPCS | Performed by: INTERNAL MEDICINE

## 2025-05-07 PROCEDURE — 83036 HEMOGLOBIN GLYCOSYLATED A1C: CPT | Performed by: NURSE PRACTITIONER

## 2025-05-07 PROCEDURE — 93005 ELECTROCARDIOGRAM TRACING: CPT | Performed by: NURSE PRACTITIONER

## 2025-05-07 PROCEDURE — 78431 MYOCRD IMG PET RST&STRS CT: CPT

## 2025-05-07 PROCEDURE — 93018 CV STRESS TEST I&R ONLY: CPT | Performed by: INTERNAL MEDICINE

## 2025-05-07 PROCEDURE — 34310000005 RUBIDIUM CHLORIDE: Performed by: NURSE PRACTITIONER

## 2025-05-07 PROCEDURE — 80061 LIPID PANEL: CPT | Performed by: NURSE PRACTITIONER

## 2025-05-07 PROCEDURE — 93306 TTE W/DOPPLER COMPLETE: CPT

## 2025-05-07 PROCEDURE — 84484 ASSAY OF TROPONIN QUANT: CPT | Performed by: NURSE PRACTITIONER

## 2025-05-07 PROCEDURE — 25010000002 REGADENOSON 0.4 MG/5ML SOLUTION: Performed by: NURSE PRACTITIONER

## 2025-05-07 PROCEDURE — 74183 MRI ABD W/O CNTR FLWD CNTR: CPT

## 2025-05-07 RX ORDER — SODIUM CHLORIDE 0.9 % (FLUSH) 0.9 %
10 SYRINGE (ML) INJECTION EVERY 12 HOURS SCHEDULED
Status: DISCONTINUED | OUTPATIENT
Start: 2025-05-07 | End: 2025-05-07 | Stop reason: HOSPADM

## 2025-05-07 RX ORDER — BISACODYL 5 MG/1
5 TABLET, DELAYED RELEASE ORAL DAILY PRN
Status: DISCONTINUED | OUTPATIENT
Start: 2025-05-07 | End: 2025-05-07 | Stop reason: HOSPADM

## 2025-05-07 RX ORDER — PANTOPRAZOLE SODIUM 40 MG/1
40 TABLET, DELAYED RELEASE ORAL
Status: DISCONTINUED | OUTPATIENT
Start: 2025-05-08 | End: 2025-05-07 | Stop reason: HOSPADM

## 2025-05-07 RX ORDER — SODIUM CHLORIDE 0.9 % (FLUSH) 0.9 %
10 SYRINGE (ML) INJECTION AS NEEDED
Status: DISCONTINUED | OUTPATIENT
Start: 2025-05-07 | End: 2025-05-07 | Stop reason: HOSPADM

## 2025-05-07 RX ORDER — BISACODYL 10 MG
10 SUPPOSITORY, RECTAL RECTAL DAILY PRN
Status: DISCONTINUED | OUTPATIENT
Start: 2025-05-07 | End: 2025-05-07 | Stop reason: HOSPADM

## 2025-05-07 RX ORDER — NITROGLYCERIN 0.4 MG/1
0.4 TABLET SUBLINGUAL
Status: DISCONTINUED | OUTPATIENT
Start: 2025-05-07 | End: 2025-05-07 | Stop reason: HOSPADM

## 2025-05-07 RX ORDER — POLYETHYLENE GLYCOL 3350 17 G/17G
17 POWDER, FOR SOLUTION ORAL DAILY PRN
Status: DISCONTINUED | OUTPATIENT
Start: 2025-05-07 | End: 2025-05-07 | Stop reason: HOSPADM

## 2025-05-07 RX ORDER — REGADENOSON 0.08 MG/ML
0.4 INJECTION, SOLUTION INTRAVENOUS ONCE
Status: COMPLETED | OUTPATIENT
Start: 2025-05-07 | End: 2025-05-07

## 2025-05-07 RX ORDER — SODIUM CHLORIDE 9 MG/ML
40 INJECTION, SOLUTION INTRAVENOUS AS NEEDED
Status: DISCONTINUED | OUTPATIENT
Start: 2025-05-07 | End: 2025-05-07 | Stop reason: HOSPADM

## 2025-05-07 RX ORDER — PANTOPRAZOLE SODIUM 40 MG/1
40 TABLET, DELAYED RELEASE ORAL
Qty: 30 TABLET | Refills: 0 | Status: SHIPPED | OUTPATIENT
Start: 2025-05-08 | End: 2025-06-07

## 2025-05-07 RX ORDER — AMOXICILLIN 250 MG
2 CAPSULE ORAL 2 TIMES DAILY PRN
Status: DISCONTINUED | OUTPATIENT
Start: 2025-05-07 | End: 2025-05-07 | Stop reason: HOSPADM

## 2025-05-07 RX ADMIN — RUBIDIUM CHLORIDE RB-82 1 DOSE: 150 INJECTION, SOLUTION INTRAVENOUS at 13:57

## 2025-05-07 RX ADMIN — RUBIDIUM CHLORIDE RB-82 1 DOSE: 150 INJECTION, SOLUTION INTRAVENOUS at 13:39

## 2025-05-07 RX ADMIN — REGADENOSON 0.4 MG: 0.08 INJECTION, SOLUTION INTRAVENOUS at 13:48

## 2025-05-07 RX ADMIN — GADOBENATE DIMEGLUMINE 20 ML: 529 INJECTION, SOLUTION INTRAVENOUS at 01:44

## 2025-05-07 NOTE — ED NOTES
Anmol Banerjee    Nursing Report ED to Floor:  Mental status: a/ox4  Ambulatory status: up at [facility]  Oxygen Therapy:  room air  Cardiac Rhythm: NSR  Admitted from: ed  Safety Concerns:  none  Precautions: none  Social Issues: n/a  ED Room #:  27    ED Nurse Phone Extension - [phone] or may call [phone].      HPI:   Chief Complaint   Patient presents with    Chest Pain       Past Medical History:  No past medical history on file.     Past Surgical History:  No past surgical history on file.     Admitting Doctor:   Jeronimo Clayton III, DO    Consulting Provider(s):  Consults       No orders found from 4/7/2025 to 5/7/2025.             Admitting Diagnosis:   There were no encounter diagnoses.    Most Recent Vitals:   Vitals:    05/06/25 2215 05/06/25 2230 05/06/25 2300 05/06/25 2315   BP:  122/88 116/87    Pulse: 70 65 65 69   Resp:       Temp:       SpO2: 95% 95% 96% 96%   Weight:       Height:           Active LDAs/IV Access:   Lines, Drains & Airways       Active LDAs       Name Placement date Placement time Site Days    Peripheral IV 05/06/25 2045 18 G Right Antecubital 05/06/25 2045  Antecubital  less than 1                    Labs (abnormal labs have a star):   Labs Reviewed   COMPREHENSIVE METABOLIC PANEL - Abnormal; Notable for the following components:       Result Value    Glucose 101 (*)     All other components within normal limits    Narrative:     GFR Categories in Chronic Kidney Disease (CKD)              GFR Category          GFR (mL/min/1.73)    Interpretation  G1                    90 or greater        Normal or high (1)  G2                    60-89                Mild decrease (1)  G3a                   45-59                Mild to moderate decrease  G3b                   30-44                Moderate to severe decrease  G4                    15-29                Severe decrease  G5                    14 or less           Kidney failure    (1)In the absence of evidence of kidney disease, neither  GFR category G1 or G2 fulfill the criteria for CKD.    eGFR calculation 2021 CKD-EPI creatinine equation, which does not include race as a factor   LIPASE - Abnormal; Notable for the following components:    Lipase 63 (*)     All other components within normal limits   TROPONIN - Normal    Narrative:     High Sensitive Troponin T Reference Range:  <14.0 ng/L- Negative Female for AMI  <22.0 ng/L- Negative Male for AMI  >=14 - Abnormal Female indicating possible myocardial injury.  >=22 - Abnormal Male indicating possible myocardial injury.   Clinicians would have to utilize clinical acumen, EKG, Troponin, and serial changes to determine if it is an Acute Myocardial Infarction or myocardial injury due to an underlying chronic condition.        BNP (IN-HOUSE) - Normal    Narrative:     This assay is used as an aid in the diagnosis of individuals suspected of having heart failure. It can be used as an aid in the diagnosis of acute decompensated heart failure (ADHF) in patients presenting with signs and symptoms of ADHF to the emergency department (ED). In addition, NT-proBNP of <300 pg/mL indicates ADHF is not likely.    Age Range Result Interpretation  NT-proBNP Concentration (pg/mL:      <50             Positive            >450                   Gray                 300-450                    Negative             <300    50-75           Positive            >900                  Gray                300-900                  Negative            <300      >75             Positive            >1800                  Gray                300-1800                  Negative            <300   CBC WITH AUTO DIFFERENTIAL - Normal   HIGH SENSITIVITIY TROPONIN T 1HR - Normal    Narrative:     High Sensitive Troponin T Reference Range:  <14.0 ng/L- Negative Female for AMI  <22.0 ng/L- Negative Male for AMI  >=14 - Abnormal Female indicating possible myocardial injury.  >=22 - Abnormal Male indicating possible myocardial injury.    Clinicians would have to utilize clinical acumen, EKG, Troponin, and serial changes to determine if it is an Acute Myocardial Infarction or myocardial injury due to an underlying chronic condition.        RAINBOW DRAW    Narrative:     The following orders were created for panel order [facility] Draw.  Procedure                               Abnormality         Status                     ---------                               -----------         ------                     Green Top (Gel)[[id number]]                                  Final result               Lavender Top[[id number]]                                     Final result               Gold Top - SST[[id number]]                                   Final result               Fatima Top[[id number]]                                         Final result               Light Blue Top[[id number]]                                   Final result                 Please view results for these tests on the individual orders.   CBC AND DIFFERENTIAL    Narrative:     The following orders were created for panel order CBC & Differential.  Procedure                               Abnormality         Status                     ---------                               -----------         ------                     CBC Auto Differential[[id number]]        Normal              Final result                 Please view results for these tests on the individual orders.   GREEN TOP   LAVENDER TOP   GOLD TOP - SST   GRAY TOP   LIGHT BLUE TOP       Meds Given in ED:   Medications   sodium chloride 0.9 % flush 10 mL (has no administration in time range)   aspirin chewable tablet 324 mg (324 mg Oral Given 5/6/25 2041)   iopamidol (ISOVUE-370) 76 % injection 100 mL (70 mL Intravenous Given 5/6/25 2125)           Last NIH score:                                                          Dysphagia screening results:        Avani Coma Scale:  No data recorded     CIWA:        Restraint Type:             Isolation Status:  No active isolations

## 2025-05-07 NOTE — CASE MANAGEMENT/SOCIAL WORK
Discharge Planning Assessment  [facility]     Patient Name: Anmol Banerjee  MRN: [id number]  Today's Date: 5/7/2025    Admit Date: 5/6/2025    Plan: home   Discharge Needs Assessment       Row Name 05/07/25 0819       Living Environment    People in Home spouse    Current Living Arrangements home    Primary Care Provided by [facility]    Quality of Family Relationships helpful;involved;supportive       Transition Planning    Patient/Family Anticipates Transition to home with family       Discharge Needs Assessment    Readmission Within the Last 30 Days no previous admission in last 30 days    Equipment Currently Used at Home none    Concerns to be Addressed basic needs;discharge planning                   Discharge Plan       Row Name 05/07/25 0820       Plan    Plan home    Patient/Family in Agreement with Plan yes    Plan Comments I met with this patient bedside. He lives with his wife in [facility]. He is independent with activities of daily living and mobility. He anticipates returning home after this hospitalization, and his wife can transport. Case management will follow.    Final Discharge Disposition Code 01 - home or self-care                       Demographic Summary       Row Name 05/07/25 0819       General Information    General Information Comments I confirmed that Trevor Zavala is Mr Banerjee's PCP and he has [place] [facility]      Row Name 05/07/25 0817       General Information    General Information Comments I met with this patient bedside. He lives with his wife in [facility]. He is independent with activities of daily living and mobility. He anticipates returning home after this hospitalization, and his wife can transport. Case management will follow.                   Functional Status       Row Name 05/07/25 0819       Functional Status, IADL    Medications independent    Meal Preparation independent    Housekeeping independent    Laundry independent    Shopping independent                    Psychosocial    No documentation.                  Abuse/Neglect    No documentation.                  Legal    No documentation.                  Substance Abuse    No documentation.                  Patient Forms    No documentation.                     Aleshia Manzo RN

## 2025-05-07 NOTE — H&P
[facility] Medicine Services  HISTORY AND PHYSICAL    Patient Name: Anmol Banerjee  : 1975  MRN: [id number]  Primary Care Physician: Trevor Zavala MD  Date of admission: 2025    Subjective   Subjective     Chief Complaint:  Chest pain     HPI:  Anmol Banerjee is a 50 y.o. male with no significant past medical history presents to the ED with complaints of chest pain.  Patient currently works as a  for [employer].  He tells me today while at work he began having midsternal chest pressure with associated shortness of breath prompting his arrival to ED from [facility].  There he underwent EKG, lab work including troponin level and chest x-ray all which were unremarkable.  Patient was discharged home.  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  He tells me his last tress test was 5 years ago and was unremarkable.  He denies any recent fever, chills, cough, abdominal pain, vomiting, diarrhea, dizziness, lightheadedness or palpitations.  Workup in the ED included CTA of chest which showed multiple small low-density lesions in the liver measuring slightly higher than fluid density.  Recommended contrast-enhanced hepatic protocol MRI.  Findings were discussed with patient.  Patient will be admitted to [facility] under the care of the hospitalist for further evaluation and treatment.    Of note patient states he was previously on testosterone injections until about 5 weeks ago.         Review of Systems   Constitutional:  Negative for activity change, appetite change, chills, diaphoresis, fatigue, fever and unexpected weight change.   HENT: Negative.     Eyes:  Negative for photophobia and visual disturbance.   Respiratory:  Positive for shortness of  breath. Negative for cough.    Cardiovascular:  Positive for chest pain. Negative for palpitations and leg swelling.   Gastrointestinal:  Positive for nausea. Negative for abdominal distention, abdominal pain, blood in stool, constipation, diarrhea and vomiting.   Genitourinary:  Negative for difficulty urinating, dysuria, flank pain, frequency and hematuria.   Musculoskeletal:  Negative for back pain, neck pain and neck stiffness.   Skin: Negative.    Neurological:  Positive for dizziness. Negative for speech difficulty, weakness, light-headedness, numbness and headaches.   Psychiatric/Behavioral: Negative.                  Personal History     No past medical history on file.          No past surgical history on file.    Family History:  family history includes ALS in his father; Heart disease in his maternal grandfather.     Social History:  reports that he has never smoked. He uses smokeless tobacco. He reports current alcohol use of about 2.0 standard drinks of alcohol per week. He reports that he does not use drugs.  Social History     Social History Narrative    Not on file       Medications:  sodium-potassium-magnesium sulfates    No Known Allergies    Objective   Objective     Vital Signs:   Temp:  [97.7 °F (36.5 °C)] 97.7 °F (36.5 °C)  Heart Rate:  [64-84] 84  Resp:  [18] 18  BP: (116-135)/() 121/107    Physical Exam  Vitals and nursing note reviewed.   Constitutional:       General: He is not in acute distress.     Appearance: Normal appearance. He is not ill-appearing, toxic-appearing or diaphoretic.   HENT:      Head: Normocephalic and atraumatic.      Nose: Nose normal.      Mouth/Throat:      Mouth: Mucous membranes are dry.      Pharynx: Oropharynx is clear.   Eyes:      Extraocular Movements: Extraocular movements intact.      Conjunctiva/sclera: Conjunctivae normal.      Pupils: Pupils are equal, round, and reactive to light.   Cardiovascular:      Rate and Rhythm: Normal rate and regular  rhythm.      Pulses: Normal pulses.      Heart sounds: Normal heart sounds.   Pulmonary:      Effort: Pulmonary effort is normal.      Breath sounds: Normal breath sounds.   Abdominal:      General: Bowel sounds are normal. There is no distension.      Palpations: Abdomen is soft. There is no mass.      Tenderness: There is no abdominal tenderness. There is no right CVA tenderness, left CVA tenderness, guarding or rebound.      Hernia: No hernia is present.   Musculoskeletal:         General: No swelling, tenderness, deformity or signs of injury. Normal range of motion.      Cervical back: Normal range of motion and neck supple.      Right lower leg: No edema.      Left lower leg: No edema.   Skin:     General: Skin is warm and dry.   Neurological:      General: No focal deficit present.      Mental Status: He is alert and oriented to person, place, and time. Mental status is at baseline.   Psychiatric:         Mood and Affect: Mood normal.         Behavior: Behavior normal.         Judgment: Judgment normal.            Result Review:  I have personally reviewed the results from the time of this admission to 5/6/2025 23:54 EDT and agree with these findings:  [x]  Laboratory list / accordion  [x]  Microbiology  [x]  Radiology  [x]  EKG/Telemetry   []  Cardiology/Vascular   []  Pathology  []  Old records  []  Other:  Most notable findings include:     LAB RESULTS:      Lab 05/06/25  2018   WBC 6.77   HEMOGLOBIN 16.4   HEMATOCRIT 49.1   PLATELETS 242   NEUTROS ABS 3.63   IMMATURE GRANS (ABS) 0.01   LYMPHS ABS 2.04   MONOS ABS 0.66   EOS ABS 0.39   MCV 87.5         Lab 05/06/25  2018   SODIUM 139   POTASSIUM 3.6   CHLORIDE 103   CO2 24.0   ANION GAP 12.0   BUN 11   CREATININE 1.19   EGFR 75.3   GLUCOSE 101*   CALCIUM 9.4         Lab 05/06/25  2018   TOTAL PROTEIN 6.6   ALBUMIN 4.2   GLOBULIN 2.4   ALT (SGPT) 28   AST (SGOT) 22   BILIRUBIN 0.3   ALK PHOS 58   LIPASE 63*         Lab 05/06/25  2139 05/06/25 2018   PROBNP   --  <36.0   HSTROP T 7 7                 Brief Urine Lab Results       None          Microbiology Results (last 10 days)       ** No results found for the last 240 hours. **            CT Angiogram Chest  Result Date: 5/6/2025  CT ANGIOGRAM CHEST Date of Exam: 5/6/2025 9:24 PM EDT Indication: Recurrent CP, SOA, traveling with work, r/o PE. Comparison: None available. Technique: CTA of the chest was performed after the uneventful intravenous administration of Isovue-370, 70 mL. Reconstructed coronal and sagittal images were also obtained. In addition, a 3-D volume rendered image was created for interpretation. Automated exposure control and iterative reconstruction methods were used. Findings: Pulmonary arteries: Adequate opacification of the pulmonary arteries. No evidence of acute pulmonary embolism. Thoracic aorta: The thoracic aorta is normal in caliber and contour. Cardiovascular: The heart is normal in size.  No evidence of pericardial effusion. Thyroid: The visualized portion of the thyroid is unremarkable. Lungs and Pleura: No focal consolidation or effusion. Mild bibasilar atelectasis visualized. No suspicious pulmonary nodules. No pneumothorax. Central airways are patent. Mediastinum/Ciara: No mediastinal or hilar lymphadenopathy. Lymph nodes: No axillary or supraclavicular lymphadenopathy. Bones and Soft Tissue:No acute fracture, aggressive osseous lesions, or soft tissue process. Upper Abdomen: Multiple small low-density lesions are visualized in the liver measuring slightly higher than fluid density. Representative lesion in the left hepatic lobe measures 1.7 cm.     Impression: Impression: 1.No acute pulmonary embolism. 2.Mild bibasilar atelectasis. 3.Multiple small low-density lesions in the liver measuring slightly higher than fluid density. Recommend correlation with contrast-enhanced hepatic protocol MRI. Electronically Signed: Forrest Pugh MD  5/6/2025 9:33 PM EDT  Workstation ID: [id number]    XR  Chest 1 View  Result Date: 5/6/2025  XR CHEST 1 VW Date of Exam: 5/6/2025 7:59 PM EDT Indication: Chest Pain Triage Protocol Comparison: None available. Findings: Lungs are normally expanded. Heart size is within normal limits. No significant pneumothorax, pleural effusion or focal pulmonary parenchymal opacity. Bones and soft tissues are within normal limits.    Impression: No acute cardiopulmonary abnormality. Electronically Signed: Heike Turpin MD  5/6/2025 8:17 PM EDT  Workstation ID: [id number]          Assessment & Plan   Assessment & Plan       Chest pain    Liver lesion      50 year old male presents to the ED with ongoing chest pain.    1) chest pain  - Troponin x 2 negative  - CTA chest as mentioned above  - EKG negative  - Repeat EKG in a.m.  - Plan for stress test in a.m.  -Check FLP, hemoglobin A1c  -ASA given in the ED  - As needed nitroglycerin    2) liver lesion  - CTA chest notes multiple small low-density lesions in the liver measuring slightly higher than fluid density.  - Will obtain hepatic protocol MRI.  -N.p.o.    3) Smokeless tobacco use  -cessation education provided         DVT prophylaxis:  scds    CODE STATUS:  Full Code        Expected Discharge TBD     This note has been completed as part of a split-shared workflow.     Signature: Electronically signed by RACHEL Brush, 05/06/25, 11:56 PM EDT.    ---------------------------    The patient was seen independently by the APC.  I was available for any questions or concerns.     Electronically signed by Jeronimo Clayton III, DO, 05/07/25, 12:59 AM EDT.

## 2025-05-08 LAB
QT INTERVAL: 364 MS
QT INTERVAL: 388 MS
QTC INTERVAL: 417 MS
QTC INTERVAL: 427 MS

## 2025-05-08 NOTE — DISCHARGE SUMMARY
[facility] Medicine Services  DISCHARGE SUMMARY    Patient Name: Anmol Banerjee  : 1975  MRN: [id number]    Date of Admission: 2025  7:56 PM  Date of Discharge:  2025  Primary Care Physician: Trevor Zavala MD    Consults       No orders found from 2025 to 2025.            Hospital Course     Presenting Problem: Chest pain    Active Hospital Problems    Diagnosis  POA    **Chest pain [R07.9]  Yes    Liver lesion [K76.9]  Yes    Tobacco use [Z72.0]  Yes      Resolved Hospital Problems   No resolved problems to display.          Hospital Course:  Anmol Banerjee is a 50 y.o. male here with chest pain    Chest pain  Dyspnea  -CTA wihout PE  -Myocardial perfusion study low risk  -starting PPI, follow up with PCP      Discharge Follow Up Recommendations for outpatient labs/diagnostics:  PCP 1-2 weeks    Day of Discharge     HPI:  Better overall, some overnight brief dyspnea, but resolved.     Review of Systems  As above    Vital Signs:   Temp:  [98 °F (36.7 °C)] 98 °F (36.7 °C)  Heart Rate:  [61-80] 80  Resp:  [18] 18  BP: (111-118)/(82-84) 111/84      Physical Exam:  NAD, alert and oriented  OP clear, dry MM  RRR  CTAB  +BS, soft  ANDERSON  Normal affect  No rashes    Pertinent  and/or Most Recent Results     LAB RESULTS:      Lab 25  0848 25   WBC 6.41 6.77   HEMOGLOBIN 16.8 16.4   HEMATOCRIT 50.1 49.1   PLATELETS 226 242   NEUTROS ABS 3.38 3.63   IMMATURE GRANS (ABS) 0.01 0.01   LYMPHS ABS 1.93 2.04   MONOS ABS 0.56 0.66   EOS ABS 0.47* 0.39   MCV 87.4 87.5         Lab 25  0848 25   SODIUM 140 139   POTASSIUM 5.2 3.6   CHLORIDE 105 103   CO2 27.0 24.0   ANION GAP 8.0 12.0   BUN 11 11   CREATININE 1.24 1.19   EGFR 71.7 75.3   GLUCOSE 102* 101*   CALCIUM 9.1 9.4   HEMOGLOBIN A1C 5.40  --          Lab 25   TOTAL PROTEIN 6.6   ALBUMIN 4.2   GLOBULIN 2.4   ALT (SGPT) 28   AST (SGOT) 22   BILIRUBIN 0.3   ALK PHOS 58   LIPASE  63*         Lab 05/07/25  0848 05/06/25  2139 05/06/25 2018   PROBNP  --   --  <36.0   HSTROP T 7 7 7         Lab 05/07/25  0848   CHOLESTEROL 194   LDL CHOL 124*   HDL CHOL 50   TRIGLYCERIDES 112             Brief Urine Lab Results       None          Microbiology Results (last 10 days)       ** No results found for the last 240 hours. **            Stress Test With Pet Myocardial Perfusion  Result Date: 5/7/2025    Myocardial perfusion imaging indicates a normal myocardial perfusion study with no evidence of ischemia.   Left ventricular ejection fraction is normal (Calculated EF = 61%).   No obvious coronary artery calcification noted on CT attenuation correction images   Impressions are consistent with a low risk study.     MRI Abdomen With & Without Contrast  Result Date: 5/7/2025  MRI ABDOMEN W WO CONTRAST Date of Exam: 5/7/2025 1:15 AM EDT Indication: hepatic protocol, liver lesion seen on CTA chest.  Comparison: CT chest with contrast 5/6/2025 G Technique:  Routine multiplanar/multisequence images of the abdomen were obtained before and after the uneventful administration of 20 mL Multihance. Findings: Lower chest demonstrates clear lung bases. Heart size is normal. There is no pericardial effusion or pleural effusion. Liver is normal in size and contour. There is no underlying hepatic steatosis. No suspicious enhancing hepatic lesion. There are innumerable small T2 hyperintense liver lesions scattered throughout the left and right hepatic lobes most consistent with cysts. None of these cysts have internal septations or enhancing solid components. Representative lesion in the left hepatic lobe at segment 2 measures 1.7 cm (4/13). Representative lesion in the right hepatic lobe at segment 6 measures 1.8 cm (4/22). The gallbladder is present. There is no cholelithiasis. Negative for gallbladder wall thickening or pericholecystic inflammation. Normal caliber common bile duct measuring 5 mm. No  choledocholithiasis. The spleen is normal in size. The adrenal glands are normal. The pancreas is normal in T1 signal intensity. No findings of pancreatitis. No pancreatic main duct dilatation. The kidneys are normal. No hydronephrosis. Normal bilateral renal enhancement. No suspicious renal mass. No dilated bowel loops in the upper abdomen. No upper abdominal ascites. The portal vein, splenic vein, and superior mesenteric vein are patent. The abdominal aorta and branch vasculature are patent without aortic aneurysm. The celiac artery and superior  mesenteric artery are patent. Both renal arteries are patent. The inferior mesenteric artery is patent.     Impression: 1. No acute abnormality in the abdomen. 2. Innumerable small hepatic cysts. Electronically Signed: Edgar Angeles MD  5/7/2025 8:05 AM EDT  Workstation ID: [id number]    CT Angiogram Chest  Result Date: 5/6/2025  CT ANGIOGRAM CHEST Date of Exam: 5/6/2025 9:24 PM EDT Indication: Recurrent CP, SOA, traveling with work, r/o PE. Comparison: None available. Technique: CTA of the chest was performed after the uneventful intravenous administration of Isovue-370, 70 mL. Reconstructed coronal and sagittal images were also obtained. In addition, a 3-D volume rendered image was created for interpretation. Automated exposure control and iterative reconstruction methods were used. Findings: Pulmonary arteries: Adequate opacification of the pulmonary arteries. No evidence of acute pulmonary embolism. Thoracic aorta: The thoracic aorta is normal in caliber and contour. Cardiovascular: The heart is normal in size.  No evidence of pericardial effusion. Thyroid: The visualized portion of the thyroid is unremarkable. Lungs and Pleura: No focal consolidation or effusion. Mild bibasilar atelectasis visualized. No suspicious pulmonary nodules. No pneumothorax. Central airways are patent. Mediastinum/Ciara: No mediastinal or hilar lymphadenopathy. Lymph nodes: No axillary or  supraclavicular lymphadenopathy. Bones and Soft Tissue:No acute fracture, aggressive osseous lesions, or soft tissue process. Upper Abdomen: Multiple small low-density lesions are visualized in the liver measuring slightly higher than fluid density. Representative lesion in the left hepatic lobe measures 1.7 cm.     Impression: 1.No acute pulmonary embolism. 2.Mild bibasilar atelectasis. 3.Multiple small low-density lesions in the liver measuring slightly higher than fluid density. Recommend correlation with contrast-enhanced hepatic protocol MRI. Electronically Signed: Forrest Pugh MD  5/6/2025 9:33 PM EDT  Workstation ID: [id number]    XR Chest 1 View  Result Date: 5/6/2025  XR CHEST 1 VW Date of Exam: 5/6/2025 7:59 PM EDT Indication: Chest Pain Triage Protocol Comparison: None available. Findings: Lungs are normally expanded. Heart size is within normal limits. No significant pneumothorax, pleural effusion or focal pulmonary parenchymal opacity. Bones and soft tissues are within normal limits.    No acute cardiopulmonary abnormality. Electronically Signed: Heike Turpin MD  5/6/2025 8:17 PM EDT  Workstation ID: [id number]                  Plan for Follow-up of Pending Labs/Results: Reviewed    Discharge Details        Discharge Medications        New Medications        Instructions Start Date   pantoprazole 40 MG EC tablet  Commonly known as: PROTONIX   40 mg, Oral, Every Early Morning               No Known Allergies      Discharge Disposition:  Home or Self Care    Diet:  Hospital:No active diet order           Activity:      Restrictions or Other Recommendations:         CODE STATUS:    Code Status and Medical Interventions: CPR (Attempt to Resuscitate); Full Support   Ordered at: 05/07/25 0000     Code Status (Patient has no pulse and is not breathing):    CPR (Attempt to Resuscitate)     Medical Interventions (Patient has pulse or is breathing):    Full Support     Level Of Support Discussed With:    Patient        No future appointments.    Additional Instructions for the Follow-ups that You Need to Schedule       Discharge Follow-up with PCP   As directed       Currently Documented PCP:    Trevor Zavala MD    PCP Phone Number:    [phone]     Follow Up Details: 1 week, needs follow up for non-cardiac chest pain, possible referral to GI for EGD                      Ruiz Duong MD  05/08/25      Time Spent on Discharge:  I spent  31  minutes on this discharge activity which included: face-to-face encounter with the patient, reviewing the data in the system, coordination of the care with the nursing staff as well as consultants, documentation, and entering orders.

## 2025-05-13 LAB
AORTIC DIMENSIONLESS INDEX: 0.8 (DI)
ASCENDING AORTA: 3.3 CM
AV MEAN PRESS GRAD SYS DOP V1V2: 1.1 MMHG
AV VMAX SYS DOP: 70.7 CM/SEC
BH CV ECHO MEAS - 2D AUTO EF SIEMENS: 58 %
BH CV ECHO MEAS - AO MAX PG: 2 MMHG
BH CV ECHO MEAS - AO ROOT DIAM: 2.9 CM
BH CV ECHO MEAS - AO V2 VTI: 13.6 CM
BH CV ECHO MEAS - AVA(I,D): 2.5 CM2
BH CV ECHO MEAS - IVS/LVPW: 1 CM
BH CV ECHO MEAS - IVSD: 1 CM
BH CV ECHO MEAS - LA DIMENSION: 3 CM
BH CV ECHO MEAS - LAT PEAK E' VEL: 10.8 CM/SEC
BH CV ECHO MEAS - LV MAX PG: 1.18 MMHG
BH CV ECHO MEAS - LV MEAN PG: 0.6 MMHG
BH CV ECHO MEAS - LV V1 MAX: 54.4 CM/SEC
BH CV ECHO MEAS - LV V1 VTI: 10.9 CM
BH CV ECHO MEAS - LVIDD: 4.5 CM
BH CV ECHO MEAS - LVIDS: 2.9 CM
BH CV ECHO MEAS - LVOT AREA: 3.1 CM2
BH CV ECHO MEAS - LVOT DIAM: 2 CM
BH CV ECHO MEAS - LVPWD: 1 CM
BH CV ECHO MEAS - MED PEAK E' VEL: 7.8 CM/SEC
BH CV ECHO MEAS - MV A MAX VEL: 48.5 CM/SEC
BH CV ECHO MEAS - MV DEC SLOPE: 173.1 CM/SEC2
BH CV ECHO MEAS - MV E MAX VEL: 45 CM/SEC
BH CV ECHO MEAS - MV E/A: 0.93
BH CV ECHO MEAS - MV MAX PG: 1.25 MMHG
BH CV ECHO MEAS - MV MEAN PG: 0.6 MMHG
BH CV ECHO MEAS - MV P1/2T: 85 MSEC
BH CV ECHO MEAS - MV V2 VTI: 18 CM
BH CV ECHO MEAS - MVA(P1/2T): 2.6 CM2
BH CV ECHO MEAS - MVA(VTI): 1.9 CM2
BH CV ECHO MEAS - PA ACC TIME: 0.12 SEC
BH CV ECHO MEAS - PI END-D VEL: 112.8 CM/SEC
BH CV ECHO MEAS - SV(LVOT): 34.2 ML
BH CV ECHO MEAS - SVI(LVOT): 13.7 ML/M2
BH CV ECHO MEAS - TAPSE (>1.6): 2.2 CM
BH CV ECHO MEASUREMENTS AVERAGE E/E' RATIO: 4.84
BH CV XLRA - RV BASE: 3.4 CM
BH CV XLRA - RV LENGTH: 7.7 CM
BH CV XLRA - RV MID: 3.3 CM
BH CV XLRA - TDI S': 9.5 CM/SEC
LEFT ATRIUM VOLUME INDEX: 18.3 ML/M2
LV EF 2D ECHO EST: 60 %